# Patient Record
Sex: MALE | Race: BLACK OR AFRICAN AMERICAN | NOT HISPANIC OR LATINO | Employment: FULL TIME | URBAN - METROPOLITAN AREA
[De-identification: names, ages, dates, MRNs, and addresses within clinical notes are randomized per-mention and may not be internally consistent; named-entity substitution may affect disease eponyms.]

---

## 2017-05-19 ENCOUNTER — HOSPITAL ENCOUNTER (EMERGENCY)
Facility: OTHER | Age: 27
Discharge: HOME | End: 2017-05-19
Attending: EMERGENCY MEDICINE
Payer: COMMERCIAL

## 2017-05-19 VITALS
TEMPERATURE: 98 F | HEIGHT: 67 IN | WEIGHT: 315 LBS | HEART RATE: 74 BPM | SYSTOLIC BLOOD PRESSURE: 130 MMHG | DIASTOLIC BLOOD PRESSURE: 72 MMHG | BODY MASS INDEX: 49.44 KG/M2 | RESPIRATION RATE: 18 BRPM | OXYGEN SATURATION: 100 %

## 2017-05-19 DIAGNOSIS — R07.9 CHEST PAIN: ICD-10-CM

## 2017-05-19 DIAGNOSIS — K21.9 GASTROESOPHAGEAL REFLUX DISEASE, ESOPHAGITIS PRESENCE NOT SPECIFIED: ICD-10-CM

## 2017-05-19 DIAGNOSIS — R52 PAIN: ICD-10-CM

## 2017-05-19 DIAGNOSIS — R07.9 CHEST PAIN AT REST: Primary | ICD-10-CM

## 2017-05-19 LAB
ALBUMIN SERPL-MCNC: 3.6 G/DL (ref 3.3–5.5)
ALP SERPL-CCNC: 47 U/L (ref 42–141)
BILIRUB SERPL-MCNC: 0.5 MG/DL (ref 0.2–1.6)
BILIRUBIN, POC UA: NEGATIVE
BLOOD, POC UA: NEGATIVE
BUN SERPL-MCNC: 13 MG/DL (ref 7–22)
CALCIUM SERPL-MCNC: 9.3 MG/DL (ref 8–10.3)
CHLORIDE SERPL-SCNC: 102 MMOL/L (ref 98–108)
CLARITY, POC UA: CLEAR
COLOR, POC UA: YELLOW
CREAT SERPL-MCNC: 1.3 MG/DL (ref 0.6–1.2)
GLUCOSE SERPL-MCNC: 95 MG/DL (ref 73–118)
GLUCOSE, POC UA: NEGATIVE
KETONES, POC UA: NEGATIVE
LEUKOCYTE EST, POC UA: NEGATIVE
NITRITE, POC UA: NEGATIVE
PH UR STRIP: 7 [PH]
POC ALT (SGPT): 26 U/L (ref 10–47)
POC AST (SGOT): 25 U/L (ref 11–38)
POC CARDIAC TROPONIN I: 0 NG/ML
POC D-DI: 705 NG/ML (ref 0–450)
POC PTINR: 0.9 (ref 0.9–1.2)
POC PTWBT: 11.2 SEC (ref 9.7–14.3)
POC TCO2: 29 MMOL/L (ref 18–33)
POTASSIUM BLD-SCNC: 3.2 MMOL/L (ref 3.6–5.1)
PROTEIN, POC UA: NEGATIVE
PROTEIN, POC: 7.1 G/DL (ref 6.4–8.1)
SAMPLE: NORMAL
SAMPLE: NORMAL
SODIUM BLD-SCNC: 140 MMOL/L (ref 128–145)
SPECIFIC GRAVITY, POC UA: 1.02
UROBILINOGEN, POC UA: 0.2 E.U./DL

## 2017-05-19 PROCEDURE — 25500020 PHARM REV CODE 255

## 2017-05-19 PROCEDURE — 25000242 PHARM REV CODE 250 ALT 637 W/ HCPCS: Performed by: EMERGENCY MEDICINE

## 2017-05-19 PROCEDURE — 85379 FIBRIN DEGRADATION QUANT: CPT

## 2017-05-19 PROCEDURE — 94760 N-INVAS EAR/PLS OXIMETRY 1: CPT

## 2017-05-19 PROCEDURE — 93010 ELECTROCARDIOGRAM REPORT: CPT | Mod: ,,, | Performed by: INTERNAL MEDICINE

## 2017-05-19 PROCEDURE — 80053 COMPREHEN METABOLIC PANEL: CPT

## 2017-05-19 PROCEDURE — 94640 AIRWAY INHALATION TREATMENT: CPT

## 2017-05-19 PROCEDURE — 99285 EMERGENCY DEPT VISIT HI MDM: CPT | Mod: 25

## 2017-05-19 PROCEDURE — 93005 ELECTROCARDIOGRAM TRACING: CPT

## 2017-05-19 PROCEDURE — 85025 COMPLETE CBC W/AUTO DIFF WBC: CPT

## 2017-05-19 PROCEDURE — 25000003 PHARM REV CODE 250: Performed by: EMERGENCY MEDICINE

## 2017-05-19 RX ORDER — NAPROXEN 500 MG/1
500 TABLET ORAL 2 TIMES DAILY WITH MEALS
Qty: 20 TABLET | Refills: 0 | Status: SHIPPED | OUTPATIENT
Start: 2017-05-19 | End: 2018-03-23 | Stop reason: ALTCHOICE

## 2017-05-19 RX ORDER — FAMOTIDINE 20 MG/1
20 TABLET, FILM COATED ORAL 2 TIMES DAILY
Qty: 20 TABLET | Refills: 0 | Status: SHIPPED | OUTPATIENT
Start: 2017-05-19 | End: 2018-03-23 | Stop reason: ALTCHOICE

## 2017-05-19 RX ORDER — IPRATROPIUM BROMIDE AND ALBUTEROL SULFATE 2.5; .5 MG/3ML; MG/3ML
3 SOLUTION RESPIRATORY (INHALATION) ONCE
Status: COMPLETED | OUTPATIENT
Start: 2017-05-19 | End: 2017-05-19

## 2017-05-19 RX ORDER — CYCLOBENZAPRINE HCL 10 MG
10 TABLET ORAL 3 TIMES DAILY PRN
Qty: 15 TABLET | Refills: 0 | Status: SHIPPED | OUTPATIENT
Start: 2017-05-19 | End: 2017-05-24

## 2017-05-19 RX ADMIN — IPRATROPIUM BROMIDE AND ALBUTEROL SULFATE 3 ML: .5; 3 SOLUTION RESPIRATORY (INHALATION) at 05:05

## 2017-05-19 RX ADMIN — IOHEXOL 100 ML: 350 INJECTION, SOLUTION INTRAVENOUS at 05:05

## 2017-05-19 RX ADMIN — LIDOCAINE HYDROCHLORIDE: 20 SOLUTION ORAL; TOPICAL at 04:05

## 2017-05-19 NOTE — ED AVS SNAPSHOT
[facility] EMERGENCY DEPARTMENT  [address] [facility] [address]               Alan Vu   2017  4:41 PM   ED    Description:  Male : 1990   Department:  [facility]           Your Care was Coordinated By:     Provider Role From To    Rad Shukla MD Attending Provider 17 1640 17 1739      Reason for Visit     Chest Pain           Diagnoses this Visit        Comments    Chest pain at rest    -  Primary     Chest pain         Pain         Gastroesophageal reflux disease, esophagitis presence not specified           ED Disposition     None           To Do List           Follow-up Information     Follow up with Lux Cullen MD. Schedule an appointment as soon as possible for a visit in 2 days.    Specialty:  Internal Medicine    Contact information:    [address] [facility] [address]  [phone]          Follow up with Robel Yang MD. Schedule an appointment as soon as possible for a visit in 2 days.    Specialty:  Cardiology    Contact information:    [address] [facility] [address]  [phone]          Follow up with [facility].    Specialty:  Emergency Medicine    Why:  If symptoms worsen    Contact information:    [address] [facility] [address]  [phone]       These Medications        Disp Refills Start End    famotidine (PEPCID) 20 MG tablet 20 tablet 0 2017    Take 1 tablet (20 mg total) by mouth 2 (two) times daily. - Oral    Pharmacy: [facility]/pharmacy #[id number] - [place]  [address] [facility]. Ph #: [phone]       naproxen (NAPROSYN) 500 MG tablet 20 tablet 0 2017     Take 1 tablet (500 mg total) by mouth 2 (two) times daily with meals. - Oral    Pharmacy: [facility]/pharmacy #[id number] - [place]  [address] [facility]. Ph #: [phone]       cyclobenzaprine (FLEXERIL) 10 MG tablet 15 tablet 0 2017    Take 1 tablet (10 mg total) by mouth 3  (three) times daily as needed for Muscle spasms. - Oral    Pharmacy: [facility]/pharmacy #[id number] - [place], [place] - [address] [facility].  #: [phone]         [facility]     [facility] Nurse Care Line - 24/7 Assistance  Unless otherwise directed by your provider, please contact [facility], our nurse care line that is available for 24/7 assistance.     Registered nurses in the [facility] provide: appointment scheduling, clinical advisement, health education, and other advisory services.  Call: [phone] (toll free)               Medications           Message regarding Medications     Verify the changes and/or additions to your medication regime listed below are the same as discussed with your clinician today.  If any of these changes or additions are incorrect, please notify your healthcare provider.        START taking these NEW medications        Refills    famotidine (PEPCID) 20 MG tablet 0    Sig: Take 1 tablet (20 mg total) by mouth 2 (two) times daily.    Class: Print    Route: Oral    naproxen (NAPROSYN) 500 MG tablet 0    Sig: Take 1 tablet (500 mg total) by mouth 2 (two) times daily with meals.    Class: Print    Route: Oral    cyclobenzaprine (FLEXERIL) 10 MG tablet 0    Sig: Take 1 tablet (10 mg total) by mouth 3 (three) times daily as needed for Muscle spasms.    Class: Print    Route: Oral      These medications were administered today        Dose Freq    albuterol-ipratropium 2.5mg-0.5mg/3mL nebulizer solution 3 mL 3 mL Once    Sig: Take 3 mLs by nebulization once.    Class: Normal    Route: Nebulization    (pyxis) gi cocktail (mylanta 30 mL, lidocaine 2 % viscous 10 mL, dicyclomine 10 mL) 50 mL  ED 1 Time    Sig: Take by mouth ED 1 Time.    Class: Normal    Route: Oral    omnipaque 350 iohexol 100 mL 100 mL IMG once as needed    Sig: Inject 100 mLs into the vein ONCE PRN for contrast.    Class: Normal    Route: Intravenous    omnipaque 350 iohexol 350 mg iodine/mL      Notes to  "Pharmacy: Created by cabinet override           Verify that the below list of medications is an accurate representation of the medications you are currently taking.  If none reported, the list may be blank. If incorrect, please contact your healthcare provider. Carry this list with you in case of emergency.           Current Medications     cyclobenzaprine (FLEXERIL) 10 MG tablet Take 1 tablet (10 mg total) by mouth 3 (three) times daily as needed for Muscle spasms.    diphenhydrAMINE (BENADRYL) 25 mg capsule Take 25 mg by mouth every 6 (six) hours as needed for Itching.    famotidine (PEPCID) 20 MG tablet Take 1 tablet (20 mg total) by mouth 2 (two) times daily.    naproxen (NAPROSYN) 500 MG tablet Take 1 tablet (500 mg total) by mouth 2 (two) times daily with meals.    omnipaque 350 iohexol 100 mL Inject 100 mLs into the vein ONCE PRN for contrast.    tramadol (ULTRAM) 50 mg tablet Take 1 tablet (50 mg total) by mouth every 12 (twelve) hours as needed for Pain.           Clinical Reference Information           Your Vitals Were     BP Pulse Temp Resp Height Weight    134/73 (BP Location: Left arm, Patient Position: Sitting) 71 98.3 °F (36.8 °C) (Temporal) 16 5' 7" (1.702 m) 146.5 kg (323 lb)    SpO2 BMI             100% 50.59 kg/m2         Allergies as of 5/19/2017     No Known Allergies      Immunizations Administered on Date of Encounter - 5/19/2017     None      ED Micro, Lab, POCT     Start Ordered       Status Ordering Provider    05/19/17 1817 05/19/17 1817  Troponin ISTAT  Once      Final result     05/19/17 1803 05/19/17 1803  POCT URINALYSIS W/O SCOPE  Once      Final result     05/19/17 1802 05/19/17 1802  ISTAT PROCEDURE  Once      Final result     05/19/17 1750 05/19/17 1749  POCT Troponin  Once      Completed     05/19/17 1750 05/19/17 1749  POCT Protime-INR  Once      Completed     05/19/17 1730 05/19/17 1730  POCT D Dimer  Once      Final result     05/19/17 1705 05/19/17 1705  POCT CMP  Once      " Final result     05/19/17 1659 05/19/17 1658  POCT CBC  Once      Final result     05/19/17 1659 05/19/17 1658  POCT CMP  Once      Completed     05/19/17 1659 05/19/17 1658  POCT D Dimer  Once      Completed       ED Imaging Orders     Start Ordered       Status Ordering Provider    05/19/17 1734 05/19/17 1734  CTA Chest Non-Coronary - PE Study  1 time imaging      Final result     05/19/17 1659 05/19/17 1658  X-Ray Chest PA And Lateral  1 time imaging      Final result         Discharge Instructions         Medicines for Acid Reflux  Your healthcare provider has told you that you have acid reflux. This condition causes stomach acid to wash up into your throat. For most people, acid reflux is troubling but not dangerous. But left untreated, acid reflux sometimes damages the esophagus. Medicines can help control acid reflux and limit your risk of future problems.  Medicines for acid reflux  Your healthcare provider may prescribe medicine to help treat your acid reflux. Medicine will be based on your symptoms and any test results. Your provider will explain how to take your medicine. You will also be told about possible side effects.  Reducing stomach acid  Your provider may suggest antacids that you can buy over the counter. Antacids can give fast relief. Or you may be told to take a type of medicine called H2 blockers. These are available over the counter and by prescription (for higher doses).  Blocking stomach acid  In more severe cases, your healthcare provider may suggest stronger medicines such as proton pump inhibitors (PPIs). These keep the stomach from making acid. They are often prescribed for long-term use.  Other medicines  In some cases medicines to reduce or block stomach acid may not work. Then you may be switched to another type of medicine that helps your stomach empty better.     Date Last Reviewed: 10/1/2016  © 9500-6414 [employer]. [address] [place] [address], [place] [address]. All  rights reserved. This information is not intended as a substitute for professional medical care. Always follow your healthcare professional's instructions.          Uncertain Causes of Chest Pain    Chest pain can happen for a number of reasons. Sometimes the cause can't be determined. If your condition does not seem serious, and your pain does not appear to be coming from your heart, your healthcare provider may recommend watching it closely. Sometimes the signs of a serious problem take more time to appear. Many problems not related to your heart can cause chest pain.These include:  · Musculoskeletal. Costochondritis, an inflammation of the tissues around the ribs that can occur from trauma or overuse injuries  · Respiratory. Pneumonia, pneumothorax, or pneumonitis (inflammation of the lining of the chest and lungs)  · Gastrointestinal. Esophageal reflux, heartburn, or gallbladder disease  · Anxiety and panic disorders  · Nerve compression and neuritis  · Miscellaneous problems such as aortic aneurysm or pulmonary embolism (a blood clot in the lungs)  Home care  After your visit, follow these recommendations:  · Rest today and avoid strenuous activity.  · Take any prescribed medicine as directed.  · Be aware of any recurrent chest pain and notice any changes  Follow-up care  Follow up with your healthcare provider if you do not start to feel better within 24 hours, or as advised.  Call 911  Call 911 if any of these occur:  · A change in the type of pain: if it feels different, becomes more severe, lasts longer, or begins to spread into your shoulder, arm, neck, jaw or back  · Shortness of breath or increased pain with breathing  · Weakness, dizziness, or fainting  · Rapid heart beat  · Crushing sensation in your chest  When to seek medical advice  Call your healthcare provider right away if any of the following occur:  · Cough with dark colored sputum (phlegm) or blood  · Fever of 100.4ºF (38ºC) or higher, or as  directed by your healthcare provider  · Swelling, pain or redness in one leg  · Shortness of breath  Date Last Reviewed: 12/30/2015  © 2846-6331 [employer]. [address] [place] [address], [place] [address]. All rights reserved. This information is not intended as a substitute for professional medical care. Always follow your healthcare professional's instructions.          [facility] Sign-Up     Activating your [facility] account is as easy as 1-2-3!     1) Visit [email or web address], select Sign Up Now, enter this activation code and your date of birth, then select Next.  [email or web address]  Expires: 7/3/2017  6:49 PM      2) Create a username and password to use when you visit [facility] in the future and select a security question in case you lose your password and select Next.    3) Enter your e-mail address and click Sign Up!    Additional Information  If you have questions, please e-mail [email or web address] or call [phone] to talk to our [facility] staff. Remember, [facility] is NOT to be used for urgent needs. For medical emergencies, dial 911.         Smoking Cessation     If you would like to quit smoking:   You may be eligible for free services if you are a [place] resident and started smoking cigarettes before September 1, 1990.  Call the Smoking Cessation Trust (SCT) toll free at (408) 994-1889 or (431) 706-6029.   Call 3-800-QUIT-NOW if you do not meet the above criteria.   Contact us via email: [email or web address]   View our website for more information: [email or web address]         [facility] complies with applicable Federal civil rights laws and does not discriminate on the basis of race, color, national origin, age, disability, or sex.        Language Assistance Services     ATTENTION: Language assistance services are available, free of charge. Please call [phone].      ATENCIÓN: Si habla español, tiene a robert disposición servicios gratuitos de  asistencia lingüística. [facility] [phone].     NATALI Ý: N?u b?n nói Ti?ng Vi?t, có các d?ch v? h? tr? ngôn ng? mi?n phí martineh cho b?n. G?i s? [phone].

## 2017-05-19 NOTE — ED PROVIDER NOTES
Encounter Date: 5/19/2017       History     Chief Complaint   Patient presents with    Chest Pain     reports intermittent chest and back pain at night onset 3days ago, pain with movement and when taking a deep breath     Review of patient's allergies indicates:  No Known Allergies  Patient is a 27 y.o. male presenting with the following complaint: chest pain. The history is provided by the patient.   Chest Pain   The current episode started several days ago. Chest pain occurs intermittently. The chest pain is unchanged. The pain is associated with breathing and coughing (Worse at night with recumbency). At its most intense, the chest pain is at 5/10. The chest pain is currently at 3/10. The quality of the pain is described as sharp (Fleeting type pain). The pain radiates to the epigastrium. Chest pain is worsened by certain positions (Recumbency). Primary symptoms include cough. Pertinent negatives for primary symptoms include no fever, no fatigue and no shortness of breath.   The cough began yesterday. The cough is non-productive (recently started smoking). He tried nothing for the symptoms.     History reviewed. No pertinent past medical history.  Past Surgical History:   Procedure Laterality Date    ANKLE FRACTURE SURGERY      right    FRACTURE SURGERY      WISDOM TOOTH EXTRACTION       Family History   Problem Relation Age of Onset    Heart disease Mother     Hypertension Mother     Diabetes Maternal Grandmother     Diabetes Maternal Grandfather      Social History   Substance Use Topics    Smoking status: Current Some Day Smoker     Packs/day: 1.00     Types: Cigarettes    Smokeless tobacco: None      Comment: states pack last 1 week     Alcohol use No     Review of Systems   Constitutional: Negative.  Negative for fatigue and fever.   HENT: Negative.    Eyes: Negative.    Respiratory: Positive for cough. Negative for shortness of breath.    Cardiovascular: Positive for chest pain.    Gastrointestinal: Negative.    Endocrine: Negative.    Genitourinary: Negative.    Musculoskeletal: Negative.    Skin: Negative.    Allergic/Immunologic: Negative.    Neurological: Negative.    Hematological: Negative.    Psychiatric/Behavioral: Negative.    All other systems reviewed and are negative.      Physical Exam   Initial Vitals   BP Pulse Resp Temp SpO2   05/19/17 1648 05/19/17 1648 05/19/17 1648 05/19/17 1648 05/19/17 1648   134/73 81 18 98.3 °F (36.8 °C) 100 %     Physical Exam    Nursing note and vitals reviewed.  Constitutional: Vital signs are normal. He appears well-developed. He is active and cooperative.   HENT:   Head: Normocephalic and atraumatic.   Eyes: Conjunctivae, EOM and lids are normal. Pupils are equal, round, and reactive to light.   Neck: Trachea normal and full passive range of motion without pain. Neck supple. No thyroid mass present.   Cardiovascular: Normal rate, regular rhythm, S1 normal, S2 normal, normal heart sounds, intact distal pulses and normal pulses.   Pulmonary/Chest:       Abdominal: Soft. Normal appearance, normal aorta and bowel sounds are normal.   Musculoskeletal: Normal range of motion.   Lymphadenopathy:     He has no axillary adenopathy.   Neurological: He is alert and oriented to person, place, and time.   Skin: Skin is warm, dry and intact.   Psychiatric: He has a normal mood and affect. His speech is normal and behavior is normal. Judgment and thought content normal. Cognition and memory are normal.         ED Course   Procedures  Labs Reviewed   POCT URINALYSIS W/O SCOPE - Abnormal; Notable for the following:        Result Value    Glucose, UA Negative (*)     Bilirubin, UA Negative (*)     Ketones, UA Negative (*)     Blood, UA Negative (*)     Protein, UA Negative (*)     Nitrite, UA Negative (*)     Leukocytes, UA Negative (*)     All other components within normal limits   POCT CMP - Abnormal; Notable for the following:     POC Creatinine 1.3 (*)     POC  Potassium 3.2 (*)     All other components within normal limits   POCT D DIMER - Abnormal; Notable for the following:     POC D- (*)     All other components within normal limits   TROPONIN ISTAT   POCT CBC   POCT CMP   POCT D DIMER   POCT TROPONIN   POCT PROTIME-INR   ISTAT PROCEDURE     Troponin is 0.00.  INR is 0.9.  These are within normal limits.     Imaging Results         CTA Chest Non-Coronary - PE Study (Final result) Result time:  05/19/17 17:59:59    Final result by Interface, Rad Results In (05/19/17 17:59:59)    Narrative:    CT CHEST WITH CONTRAST, PE PROTOCOL     HISTORY: Chest pain, shortness of breath, elevated d-dimer..     COMPARISON: None.     TECHNIQUE: Sequential transaxial images were obtained through the chest and upper abdomen   after intravenous administration of 100 mL Omnipaque 350 iodinated contrast.  Coronal and   sagittal reconstructions as well as MIPS were obtained.     The total exam DLP is 424.74 mGy-cm     FINDINGS:     Slightly limited examination secondary to respiratory motion, which limits assessment of   the lung parenchyma and subsegmental pulmonary arterial branches.     No central filling defects are identified within the main pulmonary, right and left   pulmonary, and segmental branches of the pulmonary vessels.  Pulmonary artery caliber is   normal.     Heart size is normal.  No CT suggestion for right ventricular heart strain.  No   pericardial effusion.  No atherosclerotic calcifications of the coronary arteries.     Aorta and great vessels are normal in caliber.  No aneurysmal dilatation of the ascending   aorta, aortic arch, or thoracic aorta.  No obvious dissection.     Lungs are clear.  No airspace consolidation or lung mass is detected.     The airways are patent.  The trachea is midline.     No effusion.  No pneumothorax.     No pathologically enlarged lymph nodes are seen within the axillary, supraclavicular,   mediastinal, hilar, or retrocrural spaces.      The visualized upper abdomen demonstrates no acute finding.     No acute or aggressive osseous abnormalities detected.  Mild bilateral gynecomastia.     IMPRESSION:  Examination is slightly limited secondary to respiratory motion.  1.  No CT evidence of pulmonary embolus in the visualized pulmonary vessels.  2.  No acute findings in the chest.     SL: 24 ;  Signed by: Tor Christianson MD, MPH  2017-05-19 17:59:59            X-Ray Chest PA And Lateral (Final result) Result time:  05/19/17 17:20:41    Final result by Interface, Rad Results In (05/19/17 17:20:41)    Narrative:    Study Desc:   XR CHEST PA AND LATERAL  Clinical History: Chest pain, cough.     PA and lateral views of the chest.     Findings:  Heart size appears normal.  Aorta and pelvic vessels are normal in caliber.     No acute air space consolidation.  No sizable effusion or pneumothorax.     No acute osseous abnormality.     Impression:  No acute airspace disease.     SL: 24 Signed by: Brendan Costello MD  2017-05-19 17:20:39 [CDT]                                   ED Course   MDM  I assumed care of patient from Dr. Gayla HANKS chest pain on and off for several days worse with laying flat.  DDx STEMI, in STEMI, pneumonia, acid reflux, and PE  Treatment in the ED physical exam, GI cocktail.  Patient reports no improvement after GI cocktail.  Asian given albuterol breathing treatment  Patient feels much better and is ready for discharge.  Discussed labs, and imaging results.    Fill and take prescriptions as directed.  Answered questions and discussed discharge plan.    Follow up with PCP in 1-days.  Return to the emergency department if symptoms worsen or do not improve    Clinical Impression:   The primary encounter diagnosis was Chest pain at rest. Diagnoses of Chest pain, Pain, and Gastroesophageal reflux disease, esophagitis presence not specified were also pertinent to this visit.          Venecia Montiel DO  05/19/17 0279

## 2017-05-19 NOTE — DISCHARGE INSTRUCTIONS
Medicines for Acid Reflux  Your healthcare provider has told you that you have acid reflux. This condition causes stomach acid to wash up into your throat. For most people, acid reflux is troubling but not dangerous. But left untreated, acid reflux sometimes damages the esophagus. Medicines can help control acid reflux and limit your risk of future problems.  Medicines for acid reflux  Your healthcare provider may prescribe medicine to help treat your acid reflux. Medicine will be based on your symptoms and any test results. Your provider will explain how to take your medicine. You will also be told about possible side effects.  Reducing stomach acid  Your provider may suggest antacids that you can buy over the counter. Antacids can give fast relief. Or you may be told to take a type of medicine called H2 blockers. These are available over the counter and by prescription (for higher doses).  Blocking stomach acid  In more severe cases, your healthcare provider may suggest stronger medicines such as proton pump inhibitors (PPIs). These keep the stomach from making acid. They are often prescribed for long-term use.  Other medicines  In some cases medicines to reduce or block stomach acid may not work. Then you may be switched to another type of medicine that helps your stomach empty better.     Date Last Reviewed: 10/1/2016  © 5768-7242 [employer]. [address] [place] [address], [place] [address]. All rights reserved. This information is not intended as a substitute for professional medical care. Always follow your healthcare professional's instructions.          Uncertain Causes of Chest Pain    Chest pain can happen for a number of reasons. Sometimes the cause can't be determined. If your condition does not seem serious, and your pain does not appear to be coming from your heart, your healthcare provider may recommend watching it closely. Sometimes the signs of a serious problem take more time to appear.  Many problems not related to your heart can cause chest pain.These include:  · Musculoskeletal. Costochondritis, an inflammation of the tissues around the ribs that can occur from trauma or overuse injuries  · Respiratory. Pneumonia, pneumothorax, or pneumonitis (inflammation of the lining of the chest and lungs)  · Gastrointestinal. Esophageal reflux, heartburn, or gallbladder disease  · Anxiety and panic disorders  · Nerve compression and neuritis  · Miscellaneous problems such as aortic aneurysm or pulmonary embolism (a blood clot in the lungs)  Home care  After your visit, follow these recommendations:  · Rest today and avoid strenuous activity.  · Take any prescribed medicine as directed.  · Be aware of any recurrent chest pain and notice any changes  Follow-up care  Follow up with your healthcare provider if you do not start to feel better within 24 hours, or as advised.  Call 911  Call 911 if any of these occur:  · A change in the type of pain: if it feels different, becomes more severe, lasts longer, or begins to spread into your shoulder, arm, neck, jaw or back  · Shortness of breath or increased pain with breathing  · Weakness, dizziness, or fainting  · Rapid heart beat  · Crushing sensation in your chest  When to seek medical advice  Call your healthcare provider right away if any of the following occur:  · Cough with dark colored sputum (phlegm) or blood  · Fever of 100.4ºF (38ºC) or higher, or as directed by your healthcare provider  · Swelling, pain or redness in one leg  · Shortness of breath  Date Last Reviewed: 12/30/2015  © 4042-2160 [employer]. [address] [place] [address], [place] [address]. All rights reserved. This information is not intended as a substitute for professional medical care. Always follow your healthcare professional's instructions.

## 2018-03-23 ENCOUNTER — OFFICE VISIT (OUTPATIENT)
Dept: CARDIOLOGY | Facility: CLINIC | Age: 28
End: 2018-03-23
Payer: COMMERCIAL

## 2018-03-23 VITALS
HEIGHT: 67 IN | HEART RATE: 60 BPM | RESPIRATION RATE: 15 BRPM | WEIGHT: 212.75 LBS | OXYGEN SATURATION: 98 % | DIASTOLIC BLOOD PRESSURE: 76 MMHG | BODY MASS INDEX: 33.39 KG/M2 | SYSTOLIC BLOOD PRESSURE: 129 MMHG

## 2018-03-23 DIAGNOSIS — G47.33 OSA (OBSTRUCTIVE SLEEP APNEA): ICD-10-CM

## 2018-03-23 DIAGNOSIS — Z72.0 TOBACCO ABUSE: ICD-10-CM

## 2018-03-23 DIAGNOSIS — R07.9 CHEST PAIN, UNSPECIFIED TYPE: Primary | ICD-10-CM

## 2018-03-23 DIAGNOSIS — R94.31 ABNORMAL EKG: ICD-10-CM

## 2018-03-23 PROCEDURE — 99999 PR PBB SHADOW E&M-EST. PATIENT-LVL IV: CPT | Mod: PBBFAC,,, | Performed by: INTERNAL MEDICINE

## 2018-03-23 PROCEDURE — 99204 OFFICE O/P NEW MOD 45 MIN: CPT | Mod: S$GLB,,, | Performed by: INTERNAL MEDICINE

## 2018-03-23 PROCEDURE — 93000 ELECTROCARDIOGRAM COMPLETE: CPT | Mod: S$GLB,,, | Performed by: INTERNAL MEDICINE

## 2018-03-23 NOTE — PROGRESS NOTES
CARDIOVASCULAR CONSULTATION    REASON FOR CONSULT:   Alan Vu is a 28 y.o. male who presents for eval of .      HISTORY OF PRESENT ILLNESS:   The patient is a very pleasant 28-year-old -American man who is self-referred for evaluation of chest pain.  He describes the sensation of chest pressure which awakens him from sleep with associated shortness of breath.  He denies any exertional symptoms.  He does not describe any palpitations, lightheadedness, dizziness, or sicca B.  There is no PND, orthopnea, or lower extremity edema.  He denies melena, hematuria, or claudicant symptoms.    Family history is notable for father with premature CAD.    The patient is a current smoker, and I strongly encouraged to quit smoking.    CARDIOVASCULAR HISTORY:   none    PAST MEDICAL HISTORY:   History reviewed. No pertinent past medical history.    PAST SURGICAL HISTORY:     Past Surgical History:   Procedure Laterality Date    ANKLE FRACTURE SURGERY      right    FRACTURE SURGERY      WISDOM TOOTH EXTRACTION         ALLERGIES AND MEDICATION:   Review of patient's allergies indicates:  No Known Allergies  Previous Medications    No medications on file       SOCIAL HISTORY:     Social History     Social History    Marital status:      Spouse name: N/A    Number of children: N/A    Years of education: N/A     Occupational History    Not on file.     Social History Main Topics    Smoking status: Current Some Day Smoker     Packs/day: 1.00     Types: Cigarettes    Smokeless tobacco: Never Used      Comment: states pack last 1 week     Alcohol use No    Drug use: No    Sexual activity: Yes     Partners: Female     Other Topics Concern    Not on file     Social History Narrative    No narrative on file       FAMILY HISTORY:     Family History   Problem Relation Age of Onset    Heart disease Mother     Hypertension Mother     Diabetes Maternal Grandmother     Diabetes Maternal Grandfather   "      REVIEW OF SYSTEMS:   Review of Systems   Constitutional: Negative for chills, diaphoresis and fever.   HENT: Negative for nosebleeds.    Eyes: Negative for blurred vision, double vision and photophobia.   Respiratory: Negative for hemoptysis, shortness of breath and wheezing.    Cardiovascular: Positive for chest pain and PND. Negative for palpitations, orthopnea, claudication and leg swelling.   Gastrointestinal: Negative for abdominal pain, blood in stool, heartburn, melena, nausea and vomiting.   Genitourinary: Negative for flank pain and hematuria.   Musculoskeletal: Negative for falls, myalgias and neck pain.   Skin: Negative for rash.   Neurological: Negative for dizziness, seizures, loss of consciousness, weakness and headaches.   Endo/Heme/Allergies: Negative for polydipsia. Does not bruise/bleed easily.   Psychiatric/Behavioral: Negative for depression and memory loss. The patient is not nervous/anxious.        PHYSICAL EXAM:     Vitals:    03/23/18 0944   BP: 129/76   Pulse: 60   Resp: 15    Body mass index is 33.32 kg/m².  Weight: 96.5 kg (212 lb 11.9 oz)   Height: 5' 7" (170.2 cm)     Physical Exam   Constitutional: He is oriented to person, place, and time. He appears well-developed and well-nourished. He is cooperative.  Non-toxic appearance. No distress.   HENT:   Head: Normocephalic and atraumatic.   Eyes: Conjunctivae and EOM are normal. Pupils are equal, round, and reactive to light. No scleral icterus.   Neck: Trachea normal. Neck supple. Normal carotid pulses and no JVD present. Carotid bruit is not present. No neck rigidity. No edema present. No thyromegaly present.   Cardiovascular: Normal rate, regular rhythm, S1 normal and S2 normal.  PMI is not displaced.  Exam reveals no gallop and no friction rub.    No murmur heard.  Pulses:       Carotid pulses are 2+ on the right side, and 2+ on the left side.  Pulmonary/Chest: Effort normal and breath sounds normal. No respiratory distress. He " has no wheezes. He has no rales. He exhibits no tenderness.   Abdominal: Soft. Bowel sounds are normal. He exhibits no distension. There is no hepatosplenomegaly.   Musculoskeletal: He exhibits no edema or tenderness.   Feet:   Right Foot:   Skin Integrity: Negative for ulcer.   Left Foot:   Skin Integrity: Negative for ulcer.   Neurological: He is alert and oriented to person, place, and time. No cranial nerve deficit.   Skin: Skin is warm and dry. No rash noted. No erythema.   Psychiatric: He has a normal mood and affect. His speech is normal and behavior is normal.   Vitals reviewed.      DATA:   EKG: (personally reviewed tracing)  3/23/18 SR 55, nonspec inf ST abnl    Laboratory:  CBC:    Recent Labs  Lab 11/04/15  1215   WHITE BLOOD CELL COUNT 5.8   HEMOGLOBIN 14.0   HEMATOCRIT 45.7   PLATELETS 249       CHEMISTRIES:    Recent Labs  Lab 11/04/15  1215   GLUCOSE 91   SODIUM 139   POTASSIUM 4.2   BUN BLD 12   CREATININE 1.17   EGFR IF  98   EGFR IF NON- 85   CALCIUM 9.9       CARDIAC BIOMARKERS:        COAGS:        LIPIDS/LFTS:    Recent Labs  Lab 11/04/15  1215   AST 25   ALT 26       Cardiovascular Testing:  Ordered KYLER    ASSESSMENT:   # CP, atyp  # abnl EKG  # ?FLO  # BMI 33  # tob abuse    PLAN:   Ex stress echo  FLO eval  Quit smoking, amb smoking cessation program  Check labs: CBC, CMP, TSH, Lipids  RTC 1 month    Rad Batres MD, FACC

## 2018-04-10 ENCOUNTER — HOSPITAL ENCOUNTER (OUTPATIENT)
Dept: CARDIOLOGY | Facility: HOSPITAL | Age: 28
Discharge: HOME | End: 2018-04-10
Attending: INTERNAL MEDICINE
Payer: COMMERCIAL

## 2018-04-10 DIAGNOSIS — R94.31 ABNORMAL EKG: ICD-10-CM

## 2018-04-10 DIAGNOSIS — R07.9 CHEST PAIN, UNSPECIFIED TYPE: ICD-10-CM

## 2018-04-10 LAB
DIASTOLIC DYSFUNCTION: NO
ESTIMATED PA SYSTOLIC PRESSURE: 21.49
GLOBAL PERICARDIAL EFFUSION: NORMAL
MITRAL VALVE REGURGITATION: NORMAL
RETIRED EF AND QEF - SEE NOTES: 55 (ref 55–65)
TRICUSPID VALVE REGURGITATION: NORMAL

## 2018-04-10 PROCEDURE — 93351 STRESS TTE COMPLETE: CPT | Mod: 26,,, | Performed by: INTERNAL MEDICINE

## 2018-04-10 PROCEDURE — 93351 STRESS TTE COMPLETE: CPT

## 2018-04-10 PROCEDURE — 93320 DOPPLER ECHO COMPLETE: CPT | Mod: 26,,, | Performed by: INTERNAL MEDICINE

## 2018-04-10 PROCEDURE — 93325 DOPPLER ECHO COLOR FLOW MAPG: CPT | Mod: 26,,, | Performed by: INTERNAL MEDICINE

## 2018-04-17 ENCOUNTER — OFFICE VISIT (OUTPATIENT)
Dept: CARDIOLOGY | Facility: CLINIC | Age: 28
End: 2018-04-17
Payer: COMMERCIAL

## 2018-04-17 VITALS
DIASTOLIC BLOOD PRESSURE: 80 MMHG | BODY MASS INDEX: 33.84 KG/M2 | WEIGHT: 215.63 LBS | HEIGHT: 67 IN | RESPIRATION RATE: 15 BRPM | SYSTOLIC BLOOD PRESSURE: 115 MMHG | OXYGEN SATURATION: 100 % | HEART RATE: 89 BPM

## 2018-04-17 DIAGNOSIS — G47.33 OSA (OBSTRUCTIVE SLEEP APNEA): ICD-10-CM

## 2018-04-17 DIAGNOSIS — R94.31 ABNORMAL EKG: ICD-10-CM

## 2018-04-17 DIAGNOSIS — Z72.0 TOBACCO ABUSE: ICD-10-CM

## 2018-04-17 DIAGNOSIS — R07.9 CHEST PAIN, UNSPECIFIED TYPE: Primary | ICD-10-CM

## 2018-04-17 PROCEDURE — 99999 PR PBB SHADOW E&M-EST. PATIENT-LVL III: CPT | Mod: PBBFAC,,, | Performed by: INTERNAL MEDICINE

## 2018-04-17 PROCEDURE — 99214 OFFICE O/P EST MOD 30 MIN: CPT | Mod: S$GLB,,, | Performed by: INTERNAL MEDICINE

## 2018-04-17 NOTE — PROGRESS NOTES
CARDIOVASCULAR PROGRESS NOTE    REASON FOR CONSULT:   Alan Vu is a 28 y.o. male who presents for f/u of CP, testing.      HISTORY OF PRESENT ILLNESS:   The patient returns for follow-up of his testing.  He reports no intercurrent angina or dyspnea.  He denied any chest discomfort during his treadmill stress test, but did get short of breath.  He otherwise has had no palpitations, lightheadedness, dizziness, or syncope.  He denies PND, orthopnea, or lower extremity edema.  There's been no melena, hematuria, or claudicant symptoms.    Reviewed his excess stress echocardiogram, which was normal.  I also reviewed his laboratories which were all normal except for creatinine of 1.5, however his estimated GFR is greater than 60.    The patient does report significant improvement in his smoking, but does continue to smoke cigarettes occasionally.  I've encouraged him to continue his efforts at smoking cessation.  Lastly, the patient tells me that he apparently has issues waking up in the middle the night, and his body habitus is certainly a set up for sleep apnea.  I plan to refer him to Dr. Nunn for further evaluation.  I've also encouraged the patient to follow-up with primary care physician.    CARDIOVASCULAR HISTORY:   none    PAST MEDICAL HISTORY:   History reviewed. No pertinent past medical history.    PAST SURGICAL HISTORY:     Past Surgical History:   Procedure Laterality Date    ANKLE FRACTURE SURGERY      right    FRACTURE SURGERY      WISDOM TOOTH EXTRACTION         ALLERGIES AND MEDICATION:   Review of patient's allergies indicates:  No Known Allergies  Previous Medications    No medications on file       SOCIAL HISTORY:     Social History     Social History    Marital status:      Spouse name: N/A    Number of children: N/A    Years of education: N/A     Occupational History    Not on file.     Social History Main Topics    Smoking status: Current Some Day Smoker     Packs/day: 1.00  "    Types: Cigarettes    Smokeless tobacco: Never Used      Comment: states pack last 1 week     Alcohol use No    Drug use: No    Sexual activity: Yes     Partners: Female     Other Topics Concern    Not on file     Social History Narrative    No narrative on file       FAMILY HISTORY:     Family History   Problem Relation Age of Onset    Heart disease Mother     Hypertension Mother     Diabetes Maternal Grandmother     Diabetes Maternal Grandfather        REVIEW OF SYSTEMS:   Review of Systems   Constitutional: Negative for chills, diaphoresis and fever.   HENT: Negative for nosebleeds.    Eyes: Negative for blurred vision, double vision and photophobia.   Respiratory: Negative for hemoptysis, shortness of breath and wheezing.    Cardiovascular: Positive for PND (?FLO). Negative for chest pain, palpitations, orthopnea, claudication and leg swelling.   Gastrointestinal: Negative for abdominal pain, blood in stool, heartburn, melena, nausea and vomiting.   Genitourinary: Negative for flank pain and hematuria.   Musculoskeletal: Negative for falls, myalgias and neck pain.   Skin: Negative for rash.   Neurological: Negative for dizziness, seizures, loss of consciousness, weakness and headaches.   Endo/Heme/Allergies: Negative for polydipsia. Does not bruise/bleed easily.   Psychiatric/Behavioral: Negative for depression and memory loss. The patient is not nervous/anxious.        PHYSICAL EXAM:     Vitals:    04/17/18 0956   BP: 115/80   Pulse: 89   Resp: 15    Body mass index is 33.77 kg/m².  Weight: 97.8 kg (215 lb 9.8 oz)   Height: 5' 7" (170.2 cm)     Physical Exam   Constitutional: He is oriented to person, place, and time. He appears well-developed and well-nourished. He is cooperative.  Non-toxic appearance. No distress.   HENT:   Head: Normocephalic and atraumatic.   Eyes: Conjunctivae and EOM are normal. Pupils are equal, round, and reactive to light. No scleral icterus.   Neck: Trachea normal. Neck " supple. Normal carotid pulses and no JVD present. Carotid bruit is not present. No neck rigidity. No edema present. No thyromegaly present.   Cardiovascular: Normal rate, regular rhythm, S1 normal and S2 normal.  PMI is not displaced.  Exam reveals no gallop and no friction rub.    No murmur heard.  Pulses:       Carotid pulses are 2+ on the right side, and 2+ on the left side.  Pulmonary/Chest: Effort normal and breath sounds normal. No respiratory distress. He has no wheezes. He has no rales. He exhibits no tenderness.   Abdominal: Soft. Bowel sounds are normal. He exhibits no distension. There is no hepatosplenomegaly.   Musculoskeletal: He exhibits no edema or tenderness.   Feet:   Right Foot:   Skin Integrity: Negative for ulcer.   Left Foot:   Skin Integrity: Negative for ulcer.   Neurological: He is alert and oriented to person, place, and time. No cranial nerve deficit.   Skin: Skin is warm and dry. No rash noted. No erythema.   Psychiatric: He has a normal mood and affect. His speech is normal and behavior is normal.   Vitals reviewed.      DATA:   EKG: (personally reviewed tracing)  3/23/18 SR 55, nonspec inf ST abnl    Laboratory:  CBC:    Recent Labs  Lab 11/04/15  1215 04/10/18  1022   WHITE BLOOD CELL COUNT 5.8 5.32   HEMOGLOBIN 14.0 13.3 L   HEMATOCRIT 45.7 39.4 L   PLATELETS 249 248       CHEMISTRIES:    Recent Labs  Lab 11/04/15  1215 04/10/18  1022   GLUCOSE 91 98   SODIUM 139 142   POTASSIUM 4.2 3.7   BUN BLD 12 11   CREATININE 1.17 1.5 H   EGFR IF  98 >60   EGFR IF NON- 85 >60   CALCIUM 9.9 9.6       CARDIAC BIOMARKERS:        COAGS:        LIPIDS/LFTS:    Recent Labs  Lab 11/04/15  1215 04/10/18  1022   CHOLESTEROL  --  125   TRIGLYCERIDES  --  70   HDL  --  38 L   LDL CHOLESTEROL  --  73.0   NON-HDL CHOLESTEROL  --  87   AST 25 17   ALT 26 16     Lab Results   Component Value Date    TSH 1.120 04/10/2018         Cardiovascular Testing:  KYLER 4/10/18  The patient  exercised for 11.25 minutes, corresponding to a functional capacity of 13 estimated METS, achieving a peak heart rate of 171 bpm, which is 90% of the age predicted maximum heart rate. -CP.  There were no significant electrocardiographic changes throughout the protocol suggesting ischemia.   Post Exercise Imaging:  Immediate post exercise images demonstrate left ventricular function augmenting. Left ventricular end systolic volume decreases.   No exercise induced wall motion abnormalities were identified.   CONCLUSIONS     1 - Normal left ventricular systolic function (EF 55-60%).   No evidence of stress induced myocardial ischemia.     ASSESSMENT:   # CP, atyp.  KYLER 4/2018 normal.  # abnl EKG  # ?FLO  # BMI 34, up 1 unit vs last OV  # tob abuse    PLAN:   FLO eval planned 5/29/18  Continue smoking cessation efforts  Follow-up with PCP  RTC prn    Rad Batres MD, FACC

## 2018-05-29 ENCOUNTER — OFFICE VISIT (OUTPATIENT)
Dept: SLEEP MEDICINE | Facility: CLINIC | Age: 28
End: 2018-05-29
Payer: COMMERCIAL

## 2018-05-29 VITALS
BODY MASS INDEX: 33.94 KG/M2 | SYSTOLIC BLOOD PRESSURE: 118 MMHG | WEIGHT: 211.19 LBS | DIASTOLIC BLOOD PRESSURE: 78 MMHG | HEIGHT: 66 IN

## 2018-05-29 DIAGNOSIS — G47.33 OSA (OBSTRUCTIVE SLEEP APNEA): Primary | ICD-10-CM

## 2018-05-29 DIAGNOSIS — K21.9 GASTROESOPHAGEAL REFLUX DISEASE, ESOPHAGITIS PRESENCE NOT SPECIFIED: ICD-10-CM

## 2018-05-29 DIAGNOSIS — G47.00 INSOMNIA, UNSPECIFIED TYPE: ICD-10-CM

## 2018-05-29 DIAGNOSIS — R09.81 NASAL CONGESTION: ICD-10-CM

## 2018-05-29 PROCEDURE — 99999 PR PBB SHADOW E&M-EST. PATIENT-LVL III: CPT | Mod: PBBFAC,,, | Performed by: INTERNAL MEDICINE

## 2018-05-29 PROCEDURE — 99244 OFF/OP CNSLTJ NEW/EST MOD 40: CPT | Mod: S$GLB,,, | Performed by: INTERNAL MEDICINE

## 2018-05-29 NOTE — LETTER
May 29, 2018      Rad Batres MD  [address]  [address]  [place] [address]           [facility]  [address] [place] [address]  Phone: [phone]  Fax: [phone]          Patient: Alan Vu   MR Number: [id number]   YOB: 1990   Date of Visit: 5/29/2018       Dear Dr. Rad Batres:    Thank you for referring Alan Vu to me for evaluation. Attached you will find relevant portions of my assessment and plan of care.    If you have questions, please do not hesitate to call me. I look forward to following Alan Vu along with you.    Sincerely,    Moiz Nunn MD    Enclosure  CC:  No Recipients    If you would like to receive this communication electronically, please contact [email or web address] or [phone] to request more information on [employer] access.    For providers and/or their staff who would like to refer a patient to [facility], please contact us through our one-stop-shop provider referral line, [facility], at [phone].    If you feel you have received this communication in error or would no longer like to receive these types of communications, please e-mail [email or web address]

## 2018-05-29 NOTE — PATIENT INSTRUCTIONS
Tips to Control Acid Reflux    To control acid reflux, youll need to make some basic diet and lifestyle changes. The simple steps outlined below may be all youll need to ease discomfort.  Watch what you eat  · Avoid fatty foods and spicy foods.  · Eat fewer acidic foods, such as citrus and tomato-based foods. These can increase symptoms.  · Limit drinking alcohol, caffeine, and fizzy beverages. All increase acid reflux.  · Try limiting chocolate, peppermint, and spearmint. These can worsen acid reflux in some people.  Watch when you eat  · Avoid lying down for 3 hours after eating.  · Do not snack before going to bed.  Raise your head  Raising your head and upper body by 4 to 6 inches helps limit reflux when youre lying down. Put blocks under the head of your bed frame to raise it.  Other changes  · Lose weight, if you need to  · Dont exercise near bedtime  · Avoid tight-fitting clothes  · Limit aspirin and ibuprofen  · Stop smoking   Date Last Reviewed: 7/1/2016  © 8007-4201 [employer]. [address] [place] [address]. All rights reserved. This information is not intended as a substitute for professional medical care. Always follow your healthcare professional's instructions.      prilosec 20 mg in morning 30 minutes before breakfast.     Berna or Jason will contact you to schedule your sleep study. Their number is [phone] (ext 2). The [facility] is located on 7th floor of the [facility].    We will call you when the sleep study results are ready - if you have not heard from us by 2 weeks from the date of the study, please call [phone] (ext 1).    You are advised to abstain from driving should you feel sleepy or drowsy.

## 2018-05-29 NOTE — PROGRESS NOTES
Alan Vu  was seen as a new patient at the request of  Rad Batres MD for the evaluation of  nery.    CHIEF COMPLAINT:    Chief Complaint   Patient presents with    Fatigue    Sleep Apnea    Snoring    Insomnia       HISTORY OF PRESENT ILLNESS: Alan Vu is a 28 y.o. male is here for sleep evaluation.   Patient was seeing Dr. Batres for chest discomfort.  S/p normal ett.  Chest discomfort resolved with cutting back of cigarette and wearing respirator while at work.  Patient with chronic fatigue x 2 years.  +loud snoring.  +witnessed apnea by family.  +sleepiness a/w driving.  No parasomnia.  No cataplexy.  No rls symptoms.    McFall Sleepiness Scale score during initial sleep evaluation was 14.    SLEEP ROUTINE:  Activity the hour prior to sleep: watch tv in bed    Bed partner:  wife  Time to bed:  9 pm   Lights off:  off  Sleep onset latency:  Hours to fall asleep        Disruptions or awakenings:    3-4 times     Wakeup time:      4 am   Perceived sleep quality:  tire       Daytime naps:      10 minutes (still tire)  Weekend sleep routine:     11 pm to 2 pm (still feel tire)  Caffeine use: none  exercise habit:   Lift weight       PAST MEDICAL HISTORY:    Active Ambulatory Problems     Diagnosis Date Noted    Periorbital swelling 07/04/2015     Resolved Ambulatory Problems     Diagnosis Date Noted    No Resolved Ambulatory Problems     No Additional Past Medical History                PAST SURGICAL HISTORY:    Past Surgical History:   Procedure Laterality Date    ANKLE FRACTURE SURGERY      right    FRACTURE SURGERY      WISDOM TOOTH EXTRACTION           FAMILY HISTORY:                Family History   Problem Relation Age of Onset    Heart disease Mother     Hypertension Mother     Diabetes Maternal Grandmother     Diabetes Maternal Grandfather        SOCIAL HISTORY:          Tobacco:   History   Smoking Status    Current Some Day Smoker    Packs/day: 0.10    Years: 0.50     "Types: Cigarettes   Smokeless Tobacco    Never Used     Comment: states pack last 1 week        alcohol use:    History   Alcohol Use No                 Occupation:      ALLERGIES:    Review of patient's allergies indicates:   Allergen Reactions    Chocolate flavor Anaphylaxis       CURRENT MEDICATIONS:    No current outpatient prescriptions on file.     No current facility-administered medications for this visit.                   REVIEW OF SYSTEMS:     Sleep related symptoms as per HPI.  CONST:Denies weight gain    HEENT: Denies sinus congestion  PULM: Denies dyspnea  CARD:  Denies palpitations   GI:  +frequent acid reflux not on medication  : Denies polyuria  NEURO: Denies headaches  PSYCH: Denies mood disturbance  HEME: Denies anemia   Otherwise, a balance of systems reviewed is negative.          PHYSICAL EXAM:  Vitals:    05/29/18 1023   BP: 118/78   Weight: 95.8 kg (211 lb 3.2 oz)   Height: 5' 6" (1.676 m)   PainSc: 0-No pain     Body mass index is 34.09 kg/m².     GENERAL: Normal development, well groomed  HEENT:  Conjunctivae are non-erythematous; Pupils equal, round, and reactive to light; Nose is symmetrical; Nasal mucosa is pink and moist; Septum is midline; Inferior turbinates are normal; Nasal airflow is congested on the right nare; Posterior pharynx is pink; Modified Mallampati: 3-4; Posterior palate is normal; Tonsils +1; Uvula is normal and pink;Tongue is normal; Dentition is fair; No TMJ tenderness; Jaw opening and protrusion without click and without discomfort.  NECK: Supple. Neck circumference is 17.5 inches. No thyromegaly. No palpable nodes.     SKIN: On face and neck: No abrasions, no rashes, no lesions.  No subcutaneous nodules are palpable.  RESPIRATORY: Chest is clear to auscultation.  Normal chest expansion and non-labored breathing at rest.  CARDIOVASCULAR: Normal S1, S2.  No murmurs, gallops or rubs. No carotid bruits bilaterally.  EXTREMITIES: No edema. No clubbing. " No cyanosis. Station normal. Gait normal.        NEURO/PSYCH: Oriented to time, place and person. Normal attention span and concentration. Affect is full. Mood is normal.                                              DATA no prior sleep study  Lab Results   Component Value Date    TSH 1.120 04/10/2018     ASSESSMENT    ICD-10-CM ICD-9-CM    1. FLO (obstructive sleep apnea) G47.33 327.23 Home Sleep Studies   2. Insomnia, unspecified type G47.00 780.52    3. Gastroesophageal reflux disease, esophagitis presence not specified K21.9 530.81    4. Nasal congestion R09.81 478.19        PLAN:    Sleep Apnea NEC. The patient symptomatically has loud snoring, witnessed apnea, restless sleep with findings of enlarged neck, high grade mallampatti. This warrants further investigation for possible obstructive sleep apnea.  Patient will be contacted after sleep study is done.      Tobacco abuse - encourage smoking cessation    gerd - tips for control of gerd d/w patient.  May consider ppi.      Insomnia - difficulty with sleep initiation and maintenance.  Will address after sleep study.      Nasal congestion - recent uri.  Will monitor.      Education: During our discussion today, we talked about the etiology of obstructive sleep apnea as well as the potential ramifications of untreated sleep apnea, which could include daytime sleepiness, hypertension, heart disease and/or stroke.     Precautions: The patient was advised to abstain from driving should they feel sleepy or drowsy.       Thank you for allowing me the opportunity to participate in the care of your patient.    Patient will No Follow-up on file. with md/np.    Please cc note to  Rad Batres MD.

## 2018-06-19 ENCOUNTER — TELEPHONE (OUTPATIENT)
Dept: SLEEP MEDICINE | Facility: HOSPITAL | Age: 28
End: 2018-06-19

## 2018-07-12 ENCOUNTER — TELEPHONE (OUTPATIENT)
Dept: SLEEP MEDICINE | Facility: HOSPITAL | Age: 28
End: 2018-07-12

## 2018-07-20 ENCOUNTER — TELEPHONE (OUTPATIENT)
Dept: SLEEP MEDICINE | Facility: HOSPITAL | Age: 28
End: 2018-07-20

## 2020-11-30 ENCOUNTER — HOSPITAL ENCOUNTER (EMERGENCY)
Facility: HOSPITAL | Age: 30
Discharge: HOME | End: 2020-11-30
Attending: EMERGENCY MEDICINE

## 2020-11-30 VITALS
HEIGHT: 68 IN | BODY MASS INDEX: 31.07 KG/M2 | RESPIRATION RATE: 18 BRPM | TEMPERATURE: 98 F | WEIGHT: 205 LBS | DIASTOLIC BLOOD PRESSURE: 70 MMHG | HEART RATE: 55 BPM | SYSTOLIC BLOOD PRESSURE: 121 MMHG | OXYGEN SATURATION: 100 %

## 2020-11-30 DIAGNOSIS — R07.89 CHEST WALL PAIN: Primary | ICD-10-CM

## 2020-11-30 DIAGNOSIS — R07.9 CHEST PAIN: ICD-10-CM

## 2020-11-30 PROCEDURE — 93005 ELECTROCARDIOGRAM TRACING: CPT

## 2020-11-30 PROCEDURE — 99284 EMERGENCY DEPT VISIT MOD MDM: CPT | Mod: 25

## 2020-11-30 PROCEDURE — 93010 ELECTROCARDIOGRAM REPORT: CPT | Mod: ,,, | Performed by: INTERNAL MEDICINE

## 2020-11-30 PROCEDURE — 93010 EKG 12-LEAD: ICD-10-PCS | Mod: ,,, | Performed by: INTERNAL MEDICINE

## 2020-11-30 RX ORDER — KETOROLAC TROMETHAMINE 30 MG/ML
15 INJECTION, SOLUTION INTRAMUSCULAR; INTRAVENOUS
Status: DISCONTINUED | OUTPATIENT
Start: 2020-11-30 | End: 2020-11-30 | Stop reason: HOSPADM

## 2020-11-30 RX ORDER — IBUPROFEN 600 MG/1
600 TABLET ORAL EVERY 6 HOURS PRN
Qty: 20 TABLET | Refills: 0 | Status: SHIPPED | OUTPATIENT
Start: 2020-11-30

## 2020-11-30 RX ORDER — LIDOCAINE 50 MG/G
1 PATCH TOPICAL DAILY
Qty: 15 PATCH | Refills: 1 | Status: SHIPPED | OUTPATIENT
Start: 2020-11-30

## 2020-11-30 NOTE — DISCHARGE INSTRUCTIONS
Please follow-up with your regular doctor this week.  Return if you develop new or worsening symptoms such as worsening chest pain shortness of breath.  Take medications as prescribed.

## 2020-11-30 NOTE — ED TRIAGE NOTES
30 y.o male presents to the ED with chief complaint of chest pain. Pt reports mid-sternal chest pain that started when he woke up this morning after stretching. Pt reports pain with movement and palpation. Pt denies SOB, abdominal pain, N/V/D, light headedness. AAOx4, NAD. Pain 2/10.

## 2020-11-30 NOTE — ED PROVIDER NOTES
Encounter Date: 11/30/2020    SCRIBE #1 NOTE: I, Hollie Dave, am scribing for, and in the presence of,  J Carlos Gonzales MD. I have scribed the following portions of the note - Other sections scribed: HPI, ROS.       History     Chief Complaint   Patient presents with    Chest Pain     Pt c/o midsternal chest pain, lightheadedness that started when he woke up this AM. Denies N/V, SOB. Pain is 2/10    Dizziness     CC: Chest pain    30-year-old male presents to the ED complaining of midsternal CP since this morning. He states chest pain started shortly after stretching when he woke up. Pain is exacerbated with movement. SHx of smoking tobacco products with cessation 2 years ago. No Hx of DVT or PE. No Hx of DM or HTN. No Hx of cardiac or pulmonary complications. Reports paternal FHx of cardiac Hx without MI. He denies recent travel. He denies fever, SOB, light-headedness, dizziness, abdominal pain, n/v/d, leg swelling, leg pain, rash, and any other associated symptoms.     The history is provided by the patient.     Review of patient's allergies indicates:   Allergen Reactions    Chocolate flavor Anaphylaxis     History reviewed. No pertinent past medical history.  Past Surgical History:   Procedure Laterality Date    ANKLE FRACTURE SURGERY      right    FRACTURE SURGERY      WISDOM TOOTH EXTRACTION       Family History   Problem Relation Age of Onset    Heart disease Mother     Hypertension Mother     Diabetes Maternal Grandmother     Diabetes Maternal Grandfather      Social History     Tobacco Use    Smoking status: Former Smoker     Packs/day: 0.10     Years: 0.50     Pack years: 0.05     Types: Cigarettes    Smokeless tobacco: Never Used    Tobacco comment: states pack last 1 week    Substance Use Topics    Alcohol use: No    Drug use: No     Review of Systems   Constitutional: Negative for fever.   HENT: Negative for congestion.    Eyes: Negative for visual disturbance.   Respiratory: Negative  for shortness of breath.    Cardiovascular: Positive for chest pain. Negative for leg swelling.   Gastrointestinal: Negative for abdominal pain, diarrhea, nausea and vomiting.   Genitourinary: Negative for dysuria.   Musculoskeletal: Negative for myalgias.   Skin: Negative for rash.   Neurological: Negative for dizziness and light-headedness.   All other systems reviewed and are negative.      Physical Exam     Initial Vitals [11/30/20 1048]   BP Pulse Resp Temp SpO2   133/75 (!) 57 18 98 °F (36.7 °C) 100 %      MAP       --         Physical Exam    Nursing note and vitals reviewed.  Constitutional: He appears well-developed and well-nourished. He is not diaphoretic. No distress.   HENT:   Head: Normocephalic and atraumatic.   Right Ear: External ear normal.   Left Ear: External ear normal.   Nose: Nose normal.   Mouth/Throat: Oropharynx is clear and moist.   Eyes: Conjunctivae and EOM are normal. Pupils are equal, round, and reactive to light. Right eye exhibits no discharge. Left eye exhibits no discharge. No scleral icterus.   Neck: Normal range of motion. Neck supple. No JVD present.   Cardiovascular: Regular rhythm, normal heart sounds and intact distal pulses. Exam reveals no gallop and no friction rub.    No murmur heard.  Bradycardia, regular rhythm.   Pulmonary/Chest: Breath sounds normal. No stridor. No respiratory distress. He has no wheezes. He has no rhonchi. He has no rales. He exhibits tenderness.   There are no rashes on the chest.  No deformities.  There is point tenderness over the left sternal margin.   Musculoskeletal: Normal range of motion. No tenderness or edema.   Neurological: He is alert and oriented to person, place, and time. He has normal strength. No cranial nerve deficit or sensory deficit.   Skin: Skin is warm and dry. No rash noted. No erythema. No pallor.   Psychiatric: He has a normal mood and affect. His behavior is normal. Judgment and thought content normal.         ED Course  "  Procedures  Labs Reviewed - No data to display  EKG Readings: (Independently Interpreted)   Initial Reading: No STEMI. Ectopy: No Ectopy.   EKG reviewed and interpreted by me shows sinus bradycardia rate of 58.  MI, QRS, QT intervals within normal limits.  Normal axis.  Normal R-wave progression.  There are no ST segment or T-wave ischemic findings.     ECG Results          EKG 12-lead (In process)  Result time 11/30/20 13:38:19    In process by Interface, Lab In [facility] (11/30/20 13:38:19)                 Narrative:    Test Reason : R07.9,    Vent. Rate : 058 BPM     Atrial Rate : 058 BPM     P-R Int : 164 ms          QRS Dur : 092 ms      QT Int : 414 ms       P-R-T Axes : 067 055 016 degrees     QTc Int : 406 ms    Sinus bradycardia with sinus arrhythmia  Otherwise normal ECG  When compared with ECG of 10-APR-2018 11:37,  No significant change was found    Referred By: [email or web address]   SELF           Confirmed By:                   In process by Interface, Lab In [facility] (11/30/20 13:37:36)                 Narrative:    Test Reason : R07.9,    Vent. Rate : 058 BPM     Atrial Rate : 058 BPM     P-R Int : 164 ms          QRS Dur : 092 ms      QT Int : 414 ms       P-R-T Axes : 067 055 016 degrees     QTc Int : 406 ms    Sinus bradycardia with sinus arrhythmia  Otherwise normal ECG  When compared with ECG of 10-APR-2018 11:37,  No significant change was found    Referred By: [email or web address]   SELF           Confirmed By:                             Imaging Results          X-Ray Chest AP Portable (Final result)  Result time 11/30/20 12:20:28    Final result by Dave Fortune MD (11/30/20 12:20:28)                 Impression:      No acute cardiopulmonary finding identified on this single view.      Electronically signed by: Dave Fortune MD  Date:    11/30/2020  Time:    12:20             Narrative:    EXAMINATION:  XR CHEST AP PORTABLE    CLINICAL HISTORY:  Provided history is "  Chest pain, " "unspecified".    TECHNIQUE:  One view of the chest.    COMPARISON:  05/19/2017.    FINDINGS:  Cardiac wires overlie the chest.  Cardiomediastinal silhouette is not enlarged.  No focal consolidation.  No sizable pleural effusion.  No pneumothorax.                              X-Rays:   Independently Interpreted Readings:   Other Readings:  Chest x-ray reviewed by me reveals no evidence infiltrate, consolidation, pleural effusion, pulmonary edema, or pneumothorax.    Medical Decision Making:   ED Management:  This is the emergent evaluation of a 30-year-old male presents emergency department for evaluation of midsternal chest pain that started this morning after stretching.  Differential diagnosis at the time of initial evaluation included, but was not limited to:  Muscle strain, sprain, pleurisy, viral illness.  I considered but doubt acute coronary syndrome.  This patient states that the symptoms started after stretching.  He is able to identify the exact point where he is tender.  It is made more tender by stretching.  The patient does not have any history of diabetes or high blood pressure.  He does not smoke.  He states he does not use any drugs.  He does not have any first-degree family members who have had heart attacks of similar age.  He has no PE risk factors.  No recent immobilization.  No lower extremity edema, erythema, or asymmetry.  I suspect this is musculoskeletal strain.   EKG reveals normal sinus rhythm without any evidence of ectopy, high-grade blocks, or ischemia.  Chest x-ray is clear without evidence of infiltrate, consolidation, or pneumothorax.  I believe this patient is safe and stable for discharge with symptomatic treatment for his likely musculoskeletal strain.  Advised him to follow up with PCP this week and return for any new or worsening symptoms.            Scribe Attestation:   Scribe #1: I performed the above scribed service and the documentation accurately describes the services I " performed. I attest to the accuracy of the note.                      Clinical Impression:     ICD-10-CM ICD-9-CM   1. Chest wall pain  R07.89 786.52   2. Chest pain  R07.9 786.50                          ED Disposition Condition    Discharge Stable        ED Prescriptions     Medication Sig Dispense Start Date End Date Auth. Provider    ibuprofen (ADVIL,MOTRIN) 600 MG tablet Take 1 tablet (600 mg total) by mouth every 6 (six) hours as needed for Pain. 20 tablet 11/30/2020  J Carlos Gonzales Jr., MD    lidocaine (LIDODERM) 5 % Place 1 patch onto the skin once daily. Remove & Discard patch within 12 hours or as directed by MD 15 patch 11/30/2020  J Carlos Gonzales Jr., MD        Follow-up Information    None                                 I, J Carlos Gonzales MD, personally performed the services described in this documentation. All medical record entries made by the scribe were at my direction and in my presence.  I have reviewed the chart and agree that the record reflects my personal performance and is accurate and complete.       J Carlos Gonzales Jr., MD  11/30/20 2303

## 2020-11-30 NOTE — Clinical Note
"Alan Vu (Joshua) was seen and treated in our emergency department on 11/30/2020.  He may return to work on 12/01/2020.       If you have any questions or concerns, please don't hesitate to call.      KORY WALLACE RN    "

## 2022-01-03 ENCOUNTER — HOSPITAL ENCOUNTER (EMERGENCY)
Facility: HOSPITAL | Age: 32
Discharge: HOME | End: 2022-01-03
Attending: EMERGENCY MEDICINE

## 2022-01-03 VITALS
RESPIRATION RATE: 16 BRPM | WEIGHT: 221 LBS | TEMPERATURE: 98 F | OXYGEN SATURATION: 99 % | HEIGHT: 67 IN | DIASTOLIC BLOOD PRESSURE: 81 MMHG | BODY MASS INDEX: 34.69 KG/M2 | HEART RATE: 78 BPM | SYSTOLIC BLOOD PRESSURE: 128 MMHG

## 2022-01-03 DIAGNOSIS — B34.9 VIRAL ILLNESS: Primary | ICD-10-CM

## 2022-01-03 PROCEDURE — 99282 PR EMERGENCY DEPT VISIT,LEVEL II: ICD-10-PCS | Mod: CS,,, | Performed by: NURSE PRACTITIONER

## 2022-01-03 PROCEDURE — U0005 INFEC AGEN DETEC AMPLI PROBE: HCPCS | Performed by: NURSE PRACTITIONER

## 2022-01-03 PROCEDURE — 99282 EMERGENCY DEPT VISIT SF MDM: CPT | Mod: CS,,, | Performed by: NURSE PRACTITIONER

## 2022-01-03 PROCEDURE — U0003 INFECTIOUS AGENT DETECTION BY NUCLEIC ACID (DNA OR RNA); SEVERE ACUTE RESPIRATORY SYNDROME CORONAVIRUS 2 (SARS-COV-2) (CORONAVIRUS DISEASE [COVID-19]), AMPLIFIED PROBE TECHNIQUE, MAKING USE OF HIGH THROUGHPUT TECHNOLOGIES AS DESCRIBED BY CMS-2020-01-R: HCPCS | Performed by: NURSE PRACTITIONER

## 2022-01-03 PROCEDURE — 99282 EMERGENCY DEPT VISIT SF MDM: CPT

## 2022-01-03 NOTE — DISCHARGE INSTRUCTIONS
If you have a COVID Test PENDING:  Please access [facility] to review the results of your test. Until the results of your COVID test return, you should isolate yourself so as not to potentially spread illness to others.   If your COVID test returns positive, you should isolate yourself so as not to spread illness to others. After five full days, if you are feeling better and you have not had fever for 24 hours, you can return to your typical daily activities, but you must wear a mask around others for an additional 5 days.   If your COVID test returns negative and you are either unvaccinated or more than six months out from your two-dose vaccine and are not yet boosted, you should still quarantine for 5 full days followed by strict mask use for an additional 5 full days.   If your COVID test returns negative and you have received your 2-dose initial vaccine as well as a booster, you should continue strict mask use for 10 full days after the exposure.  For all those exposed, best practice includes a test at day 5 after the exposure. This can be a home test or a test through one of the many testing centers throughout our community.   Masking is always advised to limit the spread of COVID. Cdc.gov is an excellent site to obtain the latest up to date recommendations regarding COVID and COVID testing.     After your evaluation today, we ruled out any emergent condition. However, if you develop shortness of breath, chest pain, or ANY OTHER CONCERNS please return to the emergency department for further care.      CDC Testing and Quarantine Guidelines for patients with exposure to a known-positive COVID-19 person:  A close exposure is defined as anyone who has had an exposure (masked or unmasked) to a known COVID -19 positive person within 6 feet of someone for a cumulative total of 15 minutes or more over a 24-hour period.   Vaccinated and/or if you recently had a positive covid test within 90 days do NOT need to  quarantine after contact with someone who had COVID-19 unless you develop symptoms.   Fully vaccinated people who have not had a positive test within 90 days, should get tested 3-5 days after their exposure, even if they don't have symptoms and wear a mask indoors in public for 14 days following exposure or until their test result is negative.      Unvaccinated and/or NOT had a positive test within 90 days and meet close exposure  You are required by CDC guidelines to quarantine for at least 5 days from time of exposure followed by 5 days of strict masking. It is recommended, but not required to test after 5 days, unless you develop symptoms, in which case you should test at that time.  If you get tested after 5 days and your test is positive, your 5 day period of isolation starts the day of the positive test.    If your exposure does not meet the above definition, you can return to your normal daily activities to include social distancing, wearing a mask and frequent handwashing.      Here is a link to guidance from the CDC:  https://www.cdc.gov/media/releases/2021/s1227-isolation-quarantine-guidance.html      [facility] Testing Sites:  https://ldh.la.gov/page/3934      [facility] website with testing locations and guidance:  [email or web address]

## 2022-01-03 NOTE — ED NOTES
Pt seen in triage by disaster NP. No distress noted. Pt verbalized understanding of symptoms to monitor for that would warrant return to the ED.

## 2022-01-03 NOTE — Clinical Note
"Alan "Greg Vu was seen and treated in our emergency department on 1/3/2022.     COVID-19 is present in our communities across the state. There is limited testing for COVID at this time, so not all patients can be tested. In this situation, your employee meets the following criteria:    Alan Vu has met the criteria for COVID-19 testing based upon symptoms, travel, and/or potential exposure. The test has been completed and is pending results at this time. During this time the employee is not able to work and should be quarantined per the Centers for Disease Control timelines.     If you have any questions or concerns, or if I can be of further assistance, please do not hesitate to contact me.    Sincerely,             Pedro Ceron MD"

## 2022-01-03 NOTE — ED PROVIDER NOTES
Encounter Date: 1/3/2022       History     Chief Complaint   Patient presents with    COVID-19 Concerns     Pt comes in POV with c/o body aches x2 days. Wife has COVID19. Denies medical hx.      Recently wife tested positive for covid. Has been having body aches for 2 days and would like to be tested. No other acute symptoms. Took some leftover muscle relaxer for the symptoms with some benefit.        Review of patient's allergies indicates:   Allergen Reactions    Chocolate flavor Anaphylaxis     No past medical history on file.  Past Surgical History:   Procedure Laterality Date    ANKLE FRACTURE SURGERY      right    FRACTURE SURGERY      WISDOM TOOTH EXTRACTION       Family History   Problem Relation Age of Onset    Heart disease Mother     Hypertension Mother     Diabetes Maternal Grandmother     Diabetes Maternal Grandfather      Social History     Tobacco Use    Smoking status: Former Smoker     Packs/day: 0.10     Years: 0.50     Pack years: 0.05     Types: Cigarettes    Smokeless tobacco: Never Used    Tobacco comment: states pack last 1 week    Substance Use Topics    Alcohol use: No    Drug use: No     Review of Systems   Constitutional: Negative.  Negative for activity change, appetite change, chills and diaphoresis.   HENT: Negative.    Eyes: Negative.    Respiratory: Negative.    Cardiovascular: Negative.    Gastrointestinal: Negative.    Endocrine: Negative.    Genitourinary: Negative.    Musculoskeletal: Positive for myalgias. Negative for arthralgias and back pain.   Allergic/Immunologic: Negative.    Neurological: Negative.    Hematological: Negative.    Psychiatric/Behavioral: Negative.        Physical Exam     Initial Vitals [01/03/22 1410]   BP Pulse Resp Temp SpO2   128/81 78 16 98 °F (36.7 °C) 99 %      MAP       --         Physical Exam    Nursing note and vitals reviewed.  Constitutional: He appears well-developed and well-nourished.   HENT:   Head: Normocephalic and  atraumatic.   Mouth/Throat: Oropharynx is clear and moist.   Eyes: EOM are normal. Pupils are equal, round, and reactive to light.   Neck:   Normal range of motion.  Cardiovascular: Normal rate and regular rhythm.   Pulmonary/Chest: Breath sounds normal. No stridor. No respiratory distress. He has no wheezes.   Abdominal: Abdomen is soft. Bowel sounds are normal. He exhibits no distension. There is no abdominal tenderness.   Musculoskeletal:         General: No tenderness or edema. Normal range of motion.      Cervical back: Normal range of motion.     Neurological: He is alert and oriented to person, place, and time. He has normal strength. GCS score is 15. GCS eye subscore is 4. GCS verbal subscore is 5. GCS motor subscore is 6.   Skin: Skin is warm and dry. Capillary refill takes less than 2 seconds.   Psychiatric: He has a normal mood and affect. Thought content normal.         ED Course   Procedures  Labs Reviewed   SARS-COV-2 (COVID-19) QUALITATIVE PCR          Imaging Results    None          Medications - No data to display  Medical Decision Making:   Initial Assessment:     31 yr old patient presenting with constellation of symptoms most c/w viral etiology with Covid/flu considerations    Also considered but less likely:  PTA/RPA: no hot potato voice, no uvular deviation,  Esophageal rupture: No history of dysphagia  Unlikely deep space infection/Cuates  Low suspicion for CNS infection or bacterial sinusitis given exam and history.  Flu:  Not done, unavailable  covid 19:  Pending  UPT:  Not done    No respiratory distress, otherwise relatively well appearing and nontoxic. O2 sats of 99% at rest. Patient in no acute distress. Return precautions given, patient understands and agrees with plan. All questions answered.  Instructed to follow up with PCP. There is currently no accepted treatment except conservative measures and respiratory support if appropriate.  This patient will be discharged home with  strict return precautions if worsening respiratory status.  Patient was made aware other resource limitations and the fact that they could have worsening symptoms.  Patient was informed to quarantine themselves for per CDC guidelines.     You have been evaluated in the Emergency Department by a provider and have a rapid COVID test that is currently pending. Please access [facility] to review the results of your test. Until the results of your COVID test return, you should isolate yourself so as not to potentially spread illness to others.    If your COVID test returns positive, you should isolate yourself so as not to spread illness to others. After five full days, if you are feeling better and you have not had fever for 24 hours, you can return to your typical daily activities but you must wear a mask around others for an additional 5 days.      If your COVID test returns negative and you are either unvaccinated or more than six months out from your two-dose vaccine and are not yet boosted, you should still quarantine for 5 full days followed by strict mask use for an additional 5 full days.    If your COVID test returns negative and you have received your 2-dose initial vaccine as well as a booster, you should continue strict mask use for 10 full days after the exposure.    For all those exposed, best practice includes a test at day 5 after the exposure. This can be a home test or a test through one of the many testing centers throughout our community.    Masking is always advised to limit the spread of COVID. Cdc.gov is an excellent site to obtain the latest up to date recommendations regarding COVID and COVID testing.    After your evaluation today, we ruled out any emergent condition. However, if you develop shortness of breath, chest pain, or ANY OTHER CONCERNS please return to the emergency department for further care.    Conservative measures such as ibuprofen, Tylenol discussed.  Clinical Tests:   Lab Tests:  Ordered                      Clinical Impression:   Final diagnoses:  [B34.9] Viral illness (Primary)          ED Disposition Condition    Discharge Stable        ED Prescriptions     None        Follow-up Information     Follow up With Specialties Details Why Contact Info    PCP  Call in 2 days If symptoms worsen, As needed            Wood Yao NP  01/03/22 [id number]

## 2022-01-04 DIAGNOSIS — U07.1 COVID-19 VIRUS DETECTED: ICD-10-CM

## 2022-01-04 LAB — SARS-COV-2 RNA RESP QL NAA+PROBE: DETECTED

## 2023-06-23 ENCOUNTER — TELEPHONE (OUTPATIENT)
Dept: FAMILY MEDICINE | Facility: CLINIC | Age: 33
End: 2023-06-23
Payer: COMMERCIAL

## 2023-06-23 NOTE — TELEPHONE ENCOUNTER
Patient has never seen Dr. Anthony or any provider at the clinic since 2015. Patient states he missed work today and wanted to know if he could gotten a work note to return to work. Informed patient he will need appointment. Patient will go to urgent care to be seen for his symptoms.

## 2023-06-23 NOTE — TELEPHONE ENCOUNTER
----- Message from Alee Arzola sent at 6/23/2023  3:35 PM CDT -----  Regarding: self [phone]  Type: Patient Call Back    Who called: Wife Linda     What is the request in detail: pt had to miss work due to headache/ cough/ frequently urination, he asked if he can get something in writing stating he has an appt coming in up 7/6. He asked that it be emailed.    She also asked if he can have labs drawn for appt.     Can the clinic reply by [facility]? no    Would the patient rather a call back or a response via [facility]? Call back    Best call back number:[phone]

## 2023-08-07 ENCOUNTER — LAB VISIT (OUTPATIENT)
Dept: LAB | Facility: HOSPITAL | Age: 33
End: 2023-08-07
Attending: FAMILY MEDICINE
Payer: COMMERCIAL

## 2023-08-07 ENCOUNTER — OFFICE VISIT (OUTPATIENT)
Dept: FAMILY MEDICINE | Facility: CLINIC | Age: 33
End: 2023-08-07
Payer: COMMERCIAL

## 2023-08-07 VITALS
DIASTOLIC BLOOD PRESSURE: 76 MMHG | HEART RATE: 60 BPM | TEMPERATURE: 99 F | BODY MASS INDEX: 32.76 KG/M2 | WEIGHT: 208.75 LBS | HEIGHT: 67 IN | SYSTOLIC BLOOD PRESSURE: 126 MMHG | OXYGEN SATURATION: 97 %

## 2023-08-07 DIAGNOSIS — Z11.4 ENCOUNTER FOR SCREENING FOR HIV: ICD-10-CM

## 2023-08-07 DIAGNOSIS — Z23 NEED FOR TDAP VACCINATION: ICD-10-CM

## 2023-08-07 DIAGNOSIS — Z11.59 NEED FOR HEPATITIS C SCREENING TEST: ICD-10-CM

## 2023-08-07 DIAGNOSIS — R35.0 INCREASED FREQUENCY OF URINATION: Primary | ICD-10-CM

## 2023-08-07 DIAGNOSIS — R63.1 POLYDIPSIA: ICD-10-CM

## 2023-08-07 LAB
HCV AB SERPL QL IA: NORMAL
HIV 1+2 AB+HIV1 P24 AG SERPL QL IA: NORMAL

## 2023-08-07 PROCEDURE — 90715 TDAP VACCINE 7 YRS/> IM: CPT | Mod: S$GLB,,, | Performed by: FAMILY MEDICINE

## 2023-08-07 PROCEDURE — 3078F DIAST BP <80 MM HG: CPT | Mod: CPTII,S$GLB,, | Performed by: FAMILY MEDICINE

## 2023-08-07 PROCEDURE — 99999 PR PBB SHADOW E&M-EST. PATIENT-LVL III: ICD-10-PCS | Mod: PBBFAC,,, | Performed by: FAMILY MEDICINE

## 2023-08-07 PROCEDURE — 3078F PR MOST RECENT DIASTOLIC BLOOD PRESSURE < 80 MM HG: ICD-10-PCS | Mod: CPTII,S$GLB,, | Performed by: FAMILY MEDICINE

## 2023-08-07 PROCEDURE — 1160F RVW MEDS BY RX/DR IN RCRD: CPT | Mod: CPTII,S$GLB,, | Performed by: FAMILY MEDICINE

## 2023-08-07 PROCEDURE — 3008F BODY MASS INDEX DOCD: CPT | Mod: CPTII,S$GLB,, | Performed by: FAMILY MEDICINE

## 2023-08-07 PROCEDURE — 3074F PR MOST RECENT SYSTOLIC BLOOD PRESSURE < 130 MM HG: ICD-10-PCS | Mod: CPTII,S$GLB,, | Performed by: FAMILY MEDICINE

## 2023-08-07 PROCEDURE — 1160F PR REVIEW ALL MEDS BY PRESCRIBER/CLIN PHARMACIST DOCUMENTED: ICD-10-PCS | Mod: CPTII,S$GLB,, | Performed by: FAMILY MEDICINE

## 2023-08-07 PROCEDURE — 90715 TDAP VACCINE GREATER THAN OR EQUAL TO 7YO IM: ICD-10-PCS | Mod: S$GLB,,, | Performed by: FAMILY MEDICINE

## 2023-08-07 PROCEDURE — 86803 HEPATITIS C AB TEST: CPT | Performed by: FAMILY MEDICINE

## 2023-08-07 PROCEDURE — 90471 IMMUNIZATION ADMIN: CPT | Mod: S$GLB,,, | Performed by: FAMILY MEDICINE

## 2023-08-07 PROCEDURE — 3008F PR BODY MASS INDEX (BMI) DOCUMENTED: ICD-10-PCS | Mod: CPTII,S$GLB,, | Performed by: FAMILY MEDICINE

## 2023-08-07 PROCEDURE — 99999 PR PBB SHADOW E&M-EST. PATIENT-LVL III: CPT | Mod: PBBFAC,,, | Performed by: FAMILY MEDICINE

## 2023-08-07 PROCEDURE — 99213 PR OFFICE/OUTPT VISIT, EST, LEVL III, 20-29 MIN: ICD-10-PCS | Mod: 25,S$GLB,, | Performed by: FAMILY MEDICINE

## 2023-08-07 PROCEDURE — 87389 HIV-1 AG W/HIV-1&-2 AB AG IA: CPT | Performed by: FAMILY MEDICINE

## 2023-08-07 PROCEDURE — 1159F PR MEDICATION LIST DOCUMENTED IN MEDICAL RECORD: ICD-10-PCS | Mod: CPTII,S$GLB,, | Performed by: FAMILY MEDICINE

## 2023-08-07 PROCEDURE — 1159F MED LIST DOCD IN RCRD: CPT | Mod: CPTII,S$GLB,, | Performed by: FAMILY MEDICINE

## 2023-08-07 PROCEDURE — 90471 TDAP VACCINE GREATER THAN OR EQUAL TO 7YO IM: ICD-10-PCS | Mod: S$GLB,,, | Performed by: FAMILY MEDICINE

## 2023-08-07 PROCEDURE — 3074F SYST BP LT 130 MM HG: CPT | Mod: CPTII,S$GLB,, | Performed by: FAMILY MEDICINE

## 2023-08-07 PROCEDURE — 99213 OFFICE O/P EST LOW 20 MIN: CPT | Mod: 25,S$GLB,, | Performed by: FAMILY MEDICINE

## 2023-08-07 PROCEDURE — 36415 COLL VENOUS BLD VENIPUNCTURE: CPT | Mod: PO | Performed by: FAMILY MEDICINE

## 2023-08-07 NOTE — PROGRESS NOTES
Assessment & Plan  Problem List Items Addressed This Visit    None  Visit Diagnoses       Increased frequency of urination    -  Primary    Relevant Orders    Urinalysis    Urine culture    Need for Tdap vaccination        Relevant Orders    Tdap Vaccine (Completed)    Polydipsia        Relevant Orders    Urinalysis    Urine culture    Encounter for screening for HIV        Relevant Orders    HIV 1/2 Ag/Ab (4th Gen)    Need for hepatitis C screening test        Relevant Orders    HEPATITIS C ANTIBODY        I addressed all major concerns as it related to health maintenance.  All were ordered and scheduled based on the patients wishes.  Any additional health maintenance will be readdressed at the next physical if declined or deferred by the patient.        Health Maintenance reviewed.    Follow-up: No follow-ups on file.    ______________________________________________________________________    Chief Complaint  Chief Complaint   Patient presents with    Annual Exam       HPI  Alan Vu is a 33 y.o. male with multiple medical diagnoses as listed in the medical history and problem list that presents for annual exam. Pt is new to me.   Dinner:  baked fish, rice and cabbage   Lunch:  granola bars 3 with apple and orange juice  Breakfast: mo and egg burrito   He has increased urination at night.  May have symptoms during the day but mostly at night.    It began one month ago.  Noted weight loss  He will do four hours of exercise a day.  \  He is trying to gain weight to reach a specific goal.  Strong family history of diabetes.     PAST MEDICAL HISTORY:  History reviewed. No pertinent past medical history.    PAST SURGICAL HISTORY:  Past Surgical History:   Procedure Laterality Date    ANKLE FRACTURE SURGERY      right    FRACTURE SURGERY      WISDOM TOOTH EXTRACTION         SOCIAL HISTORY:  Social History     Socioeconomic History    Marital status:    Tobacco Use    Smoking status: Former      Current packs/day: 0.10     Average packs/day: 0.1 packs/day for 0.5 years (0.1 ttl pk-yrs)     Types: Cigarettes    Smokeless tobacco: Never    Tobacco comments:     states pack last 1 week    Substance and Sexual Activity    Alcohol use: No    Drug use: No    Sexual activity: Yes     Partners: Female       FAMILY HISTORY:  Family History   Problem Relation Age of Onset    Diabetes Mother     Heart disease Mother     Hypertension Mother     Diabetes Father     Diabetes Maternal Grandmother     Diabetes Maternal Grandfather     Diabetes Maternal Uncle     Diabetes Maternal Uncle        ALLERGIES AND MEDICATIONS: updated and reviewed.  Review of patient's allergies indicates:   Allergen Reactions    Chocolate flavor Anaphylaxis     Current Outpatient Medications   Medication Sig Dispense Refill    ibuprofen (ADVIL,MOTRIN) 600 MG tablet Take 1 tablet (600 mg total) by mouth every 6 (six) hours as needed for Pain. (Patient not taking: Reported on 8/7/2023) 20 tablet 0    lidocaine (LIDODERM) 5 % Place 1 patch onto the skin once daily. Remove & Discard patch within 12 hours or as directed by MD (Patient not taking: Reported on 8/7/2023) 15 patch 1     No current facility-administered medications for this visit.         ROS  Review of Systems   Constitutional:  Positive for activity change and unexpected weight change (weight loss). Negative for appetite change, fatigue and fever.   HENT:  Negative for congestion and facial swelling.    Eyes:  Negative for visual disturbance.   Respiratory:  Negative for chest tightness, shortness of breath, wheezing and stridor.    Cardiovascular:  Negative for chest pain, palpitations and leg swelling.   Gastrointestinal:  Negative for abdominal distention, abdominal pain, constipation, diarrhea, nausea and vomiting.   Endocrine: Negative for cold intolerance, heat intolerance, polydipsia and polyuria.   Genitourinary:  Positive for frequency.   Skin: Negative.    Allergic/Immunologic:  "Negative.    Neurological:  Negative for dizziness, light-headedness, numbness and headaches.   Psychiatric/Behavioral:  Negative for agitation and decreased concentration.    Increased thirst       Physical Exam  Vitals:    08/07/23 0904   BP: 126/76   Pulse: 60   Temp: 98.7 °F (37.1 °C)   TempSrc: Oral   SpO2: 97%   Weight: 94.7 kg (208 lb 12.4 oz)   Height: 5' 7" (1.702 m)    Body mass index is 32.7 kg/m².  Weight: 94.7 kg (208 lb 12.4 oz)   Height: 5' 7" (170.2 cm)   Physical Exam  Vitals reviewed.   Constitutional:       Appearance: Normal appearance. He is well-developed.   HENT:      Head: Normocephalic and atraumatic.      Right Ear: External ear normal.      Left Ear: External ear normal.      Nose: Nose normal.   Eyes:      Extraocular Movements: Extraocular movements intact.      Conjunctiva/sclera: Conjunctivae normal.      Pupils: Pupils are equal, round, and reactive to light.   Cardiovascular:      Rate and Rhythm: Normal rate and regular rhythm.      Heart sounds: Normal heart sounds.   Pulmonary:      Effort: Pulmonary effort is normal.      Breath sounds: Normal breath sounds.   Musculoskeletal:      Cervical back: Normal range of motion.   Skin:     General: Skin is warm and dry.   Neurological:      Mental Status: He is alert and oriented to person, place, and time.   Psychiatric:         Behavior: Behavior normal.           Health Maintenance         Date Due Completion Date    Hepatitis C Screening Never done ---    COVID-19 Vaccine (1) Never done ---    HIV Screening Never done ---    Hemoglobin A1c (Diabetic Prevention Screening) Never done ---    Lipid Panel 04/10/2023 4/10/2018    Influenza Vaccine (1) 09/01/2023 ---    TETANUS VACCINE 08/07/2033 8/7/2023                Patient note was created using [employer].  Any errors in syntax or even information may not have been identified and edited on initial review prior to signing this note.    "

## 2023-08-21 ENCOUNTER — PATIENT MESSAGE (OUTPATIENT)
Dept: RESEARCH | Facility: HOSPITAL | Age: 33
End: 2023-08-21
Payer: COMMERCIAL